# Patient Record
Sex: MALE | Race: WHITE | NOT HISPANIC OR LATINO | Employment: OTHER | URBAN - NONMETROPOLITAN AREA
[De-identification: names, ages, dates, MRNs, and addresses within clinical notes are randomized per-mention and may not be internally consistent; named-entity substitution may affect disease eponyms.]

---

## 2024-01-12 LAB — HBA1C MFR BLD HPLC: 5.7 %

## 2024-06-28 NOTE — PROGRESS NOTES
PT Evaluation     Today's date: 2024  Patient name: Bill Schultz  : 1955  MRN: [id number]  Referring provider: Jorge Dacosta MD  Dx:   Encounter Diagnosis     ICD-10-CM    1. Lumbar radiculopathy  M54.16                      Assessment  Impairments: abnormal or restricted ROM, activity intolerance, impaired physical strength, lacks appropriate home exercise program, pain with function and unable to perform ADL  Barriers to therapy: PT notes the patient with decrease ROM and strength t/o the lumbar spine as well as the bilateral hip and LE with trunk/core weakness leading to increase pain levels, LE radicular symptoms, and functional limitations with need for course of skilled therapy for 6 weeks with focus on lumbar stabilization with extension based POC, manual therapy, posture, analgesic modalities, and HEP.    Understanding of Dx/Px/POC: good     Prognosis: good    Goals  ST.  Initiate HEP  2.  Decrease symptoms by 25-50%  3.  Increase ROM by 25-50%   4.  Increase strength by 1-2 mm grades  LT.  Improve spinal stability with increase trunk/core strength   2.  No LE radicular symptoms  3.  Decrease limitations with standing, walking and stairs  4.  Decrease limitations with trunk movement, lifting, and carrying  5.  Decrease limitations with transfers and ADL  6.  DC with HEP      Plan  Patient would benefit from: skilled physical therapy and PT eval  Planned modality interventions: thermotherapy: hydrocollator packs    Planned therapy interventions: manual therapy, neuromuscular re-education, postural training, patient/caregiver education, self care, strengthening, stretching, balance, flexibility, gait training, graded exercise, home exercise program and therapeutic exercise    Frequency: 2x week  Duration in weeks: 6  Treatment plan discussed with: patient  Plan details: PT notes review of POC and findings with the patient who is in agreement with PT recommendations of course of skilled  therapy.          Subjective Evaluation    History of Present Illness  Mechanism of injury: Patient reports dealing with LB and right hip and symptoms for several years with a recent flare up of symptoms with symptoms traveling into the right foot and ankle.  Patient reports he was prescribed a course of prednisone for 6 days by PCP which the patient reports has greatly helped with symptoms but states is worried about return of symptoms when medication wears off.  Patient was also referred to OPPT to address lumbar radiculopathy.  Patient reports he is retired with significant PMH of right hand amputation.   Patient Goals  Patient goals for therapy: decreased pain, increased motion, increased strength, independence with ADLs/IADLs and return to sport/leisure activities    Pain  Current pain ratin  At best pain ratin  At worst pain rating: 10  Location: Right LE  Quality: radiating, tight, pulling, discomfort, dull ache and burning    Treatments  Current treatment: medication and physical therapy        Objective     Palpation   Left   Tenderness of the erector spinae and lumbar paraspinals.     Right   Tenderness of the erector spinae and lumbar paraspinals.     Tenderness     Left Hip   No tenderness in the PSIS, greater trochanter, iliac crest and sacroiliac joint.     Right Hip   Tenderness in the PSIS. No tenderness in the greater trochanter, iliac crest and sacroiliac joint.     Neurological Testing     Reflexes   Left   Patellar (L4): normal (2+)  Achilles (S1): normal (2+)    Right   Patellar (L4): normal (2+)  Achilles (S1): normal (2+)    Active Range of Motion     Lumbar   Flexion: 54 degrees   Extension: 11 degrees   Left lateral flexion: 13 degrees     Right lateral flexion: 12 degrees   Left rotation: 14 degrees   Right rotation: 13 degrees   Left Hip   Flexion: 52 degrees   Extension: 4 degrees   Abduction: 24 degrees   External rotation (90/90): 15 degrees   Internal rotation (90/90): 11  degrees     Right Hip   Flexion: 53 degrees   Extension: 5 degrees   Abduction: 21 degrees   External rotation (90/90): 16 degrees   Internal rotation (90/90): 10 degrees     Additional Active Range of Motion Details  Trunk/core strength with abdominals of 2+/5 and lumbar paraspinals of 3/5   PT notes decrease ROM and strength t/o the bilateral hip and LE     Passive Range of Motion   Left Hip   Flexion: 57 degrees   Extension: 5 degrees   Abduction: WFL  External rotation (90/90): 21 degrees   Internal rotation (90/90): 13 degrees     Right Hip   Flexion: 56 degrees   Extension: 5 degrees   Abduction: WFL  External rotation (90/90): 19 degrees   Internal rotation (90/90): 11 degrees     Joint Play     Hypomobile: L1, L2, L3, L4, L5 and S1     Pain: L4, L5 and S1     Strength/Myotome Testing     Left Hip   Planes of Motion   Flexion: 4+  Extension: 5  Abduction: 5  Adduction: 5  External rotation: 4+  Internal rotation: 4+    Right Hip   Planes of Motion   Flexion: 4+  Extension: 5  Abduction: 5  Adduction: 5  External rotation: 4    Tests     Lumbar     Left   Negative crossed SLR.     Right   Negative crossed SLR.     Left Hip   Negative HOUSTON, FADIR and long sit.   Zackary: Negative.   Modified Zackary: Negative.   90/90 SLR: Positive.   SLR: Positive.     Right Hip   Negative HOUSTON, FADIR and long sit.   Zackary: Negative.    Modified Zackary: Negative.   90/90 SLR: Positive.   SLR: Positive.              Precautions:  Right Hand Amputation   POC 8/1/24      Manuals 7/1       Lumbar PA mobs        Bilateral HS, hip ABD, piriformis, quad and gastroc with manual lumbar traction                         Neuro Re-Ed        Stand lumbar extension against wall         Stand OAL         TB Trunk rotation         Supine Tball ABD crunch         Seated Tball Trunk Rotation and PNF         Bridge with ABD         BOSU Bridge         Ther Ex        Treadmill for ROM and strength         Supine Tball LTR         Lunge walk          Side step and squat         Leg press DL and SL                         HEP update/review  15 min        Ther Activity                        Gait Training                        Modalities        MHP PRN

## 2024-07-01 ENCOUNTER — EVALUATION (OUTPATIENT)
Dept: PHYSICAL THERAPY | Facility: CLINIC | Age: 69
End: 2024-07-01
Payer: COMMERCIAL

## 2024-07-01 ENCOUNTER — HOSPITAL ENCOUNTER (OUTPATIENT)
Dept: RADIOLOGY | Facility: HOSPITAL | Age: 69
Discharge: HOME | End: 2024-07-01
Payer: COMMERCIAL

## 2024-07-01 DIAGNOSIS — M54.30 SCIATICA, UNSPECIFIED LATERALITY: ICD-10-CM

## 2024-07-01 DIAGNOSIS — M54.16 LUMBAR RADICULOPATHY: Primary | ICD-10-CM

## 2024-07-01 PROCEDURE — 72110 X-RAY EXAM L-2 SPINE 4/>VWS: CPT

## 2024-07-01 PROCEDURE — 97535 SELF CARE MNGMENT TRAINING: CPT | Performed by: PHYSICAL THERAPIST

## 2024-07-01 PROCEDURE — 97162 PT EVAL MOD COMPLEX 30 MIN: CPT | Performed by: PHYSICAL THERAPIST

## 2024-07-01 NOTE — LETTER
2024    Jorge Dacosta MD  [address]  [address]  [facility] [address]    Patient: Bill Schultz   YOB: 1955   Date of Visit: 2024     Encounter Diagnosis     ICD-10-CM    1. Lumbar radiculopathy  M54.16           Dear Dr. Dacosta:    Thank you for your recent referral of Bill Schultz. Please review the attached evaluation summary from Bill's recent visit.     Please verify that you agree with the plan of care by signing the attached order.     If you have any questions or concerns, please do not hesitate to call.     I sincerely appreciate the opportunity to share in the care of one of your patients and hope to have another opportunity to work with you in the near future.       Sincerely,    Vahe Ballesteros, PT      Referring Provider:      I certify that I have read the below Plan of Care and certify the need for these services furnished under this plan of treatment while under my care.                    Jorge Dacosta MD  [address]  [address]  [facility] [address]  Via Fax: [phone]          PT Evaluation     Today's date: 2024  Patient name: Bill Schultz  : 1955  MRN: [id number]  Referring provider: Jorge Dacosta MD  Dx:   Encounter Diagnosis     ICD-10-CM    1. Lumbar radiculopathy  M54.16                      Assessment  Impairments: abnormal or restricted ROM, activity intolerance, impaired physical strength, lacks appropriate home exercise program, pain with function and unable to perform ADL  Barriers to therapy: PT notes the patient with decrease ROM and strength t/o the lumbar spine as well as the bilateral hip and LE with trunk/core weakness leading to increase pain levels, LE radicular symptoms, and functional limitations with need for course of skilled therapy for 6 weeks with focus on lumbar stabilization with extension based POC, manual therapy, posture, analgesic modalities, and HEP.    Understanding of Dx/Px/POC: good      Prognosis: good    Goals  ST.  Initiate HEP  2.  Decrease symptoms by 25-50%  3.  Increase ROM by 25-50%   4.  Increase strength by 1-2 mm grades  LT.  Improve spinal stability with increase trunk/core strength   2.  No LE radicular symptoms  3.  Decrease limitations with standing, walking and stairs  4.  Decrease limitations with trunk movement, lifting, and carrying  5.  Decrease limitations with transfers and ADL  6.  DC with HEP      Plan  Patient would benefit from: skilled physical therapy and PT eval  Planned modality interventions: thermotherapy: hydrocollator packs    Planned therapy interventions: manual therapy, neuromuscular re-education, postural training, patient/caregiver education, self care, strengthening, stretching, balance, flexibility, gait training, graded exercise, home exercise program and therapeutic exercise    Frequency: 2x week  Duration in weeks: 6  Treatment plan discussed with: patient  Plan details: PT notes review of POC and findings with the patient who is in agreement with PT recommendations of course of skilled therapy.          Subjective Evaluation    History of Present Illness  Mechanism of injury: Patient reports dealing with LB and right hip and symptoms for several years with a recent flare up of symptoms with symptoms traveling into the right foot and ankle.  Patient reports he was prescribed a course of prednisone for 6 days by PCP which the patient reports has greatly helped with symptoms but states is worried about return of symptoms when medication wears off.  Patient was also referred to OPPT to address lumbar radiculopathy.  Patient reports he is retired with significant PMH of right hand amputation.   Patient Goals  Patient goals for therapy: decreased pain, increased motion, increased strength, independence with ADLs/IADLs and return to sport/leisure activities    Pain  Current pain ratin  At best pain ratin  At worst pain rating: 10  Location:  Right LE  Quality: radiating, tight, pulling, discomfort, dull ache and burning    Treatments  Current treatment: medication and physical therapy        Objective     Palpation   Left   Tenderness of the erector spinae and lumbar paraspinals.     Right   Tenderness of the erector spinae and lumbar paraspinals.     Tenderness     Left Hip   No tenderness in the PSIS, greater trochanter, iliac crest and sacroiliac joint.     Right Hip   Tenderness in the PSIS. No tenderness in the greater trochanter, iliac crest and sacroiliac joint.     Neurological Testing     Reflexes   Left   Patellar (L4): normal (2+)  Achilles (S1): normal (2+)    Right   Patellar (L4): normal (2+)  Achilles (S1): normal (2+)    Active Range of Motion     Lumbar   Flexion: 54 degrees   Extension: 11 degrees   Left lateral flexion: 13 degrees     Right lateral flexion: 12 degrees   Left rotation: 14 degrees   Right rotation: 13 degrees   Left Hip   Flexion: 52 degrees   Extension: 4 degrees   Abduction: 24 degrees   External rotation (90/90): 15 degrees   Internal rotation (90/90): 11 degrees     Right Hip   Flexion: 53 degrees   Extension: 5 degrees   Abduction: 21 degrees   External rotation (90/90): 16 degrees   Internal rotation (90/90): 10 degrees     Additional Active Range of Motion Details  Trunk/core strength with abdominals of 2+/5 and lumbar paraspinals of 3/5   PT notes decrease ROM and strength t/o the bilateral hip and LE     Passive Range of Motion   Left Hip   Flexion: 57 degrees   Extension: 5 degrees   Abduction: WFL  External rotation (90/90): 21 degrees   Internal rotation (90/90): 13 degrees     Right Hip   Flexion: 56 degrees   Extension: 5 degrees   Abduction: WFL  External rotation (90/90): 19 degrees   Internal rotation (90/90): 11 degrees     Joint Play     Hypomobile: L1, L2, L3, L4, L5 and S1     Pain: L4, L5 and S1     Strength/Myotome Testing     Left Hip   Planes of Motion   Flexion: 4+  Extension: 5  Abduction:  5  Adduction: 5  External rotation: 4+  Internal rotation: 4+    Right Hip   Planes of Motion   Flexion: 4+  Extension: 5  Abduction: 5  Adduction: 5  External rotation: 4    Tests     Lumbar     Left   Negative crossed SLR.     Right   Negative crossed SLR.     Left Hip   Negative HOUSTON, FADIR and long sit.   Zackary: Negative.   Modified Zackary: Negative.   90/90 SLR: Positive.   SLR: Positive.     Right Hip   Negative [place]STON, FADIR and long sit.   Zackary: Negative.    Modified Zackary: Negative.   90/90 SLR: Positive.   SLR: Positive.              Precautions:  Right Hand Amputation   POC 8/1/24      Manuals 7/1       Lumbar PA mobs        Bilateral HS, hip ABD, piriformis, quad and gastroc with manual lumbar traction                         Neuro Re-Ed        Stand lumbar extension against wall         Stand OAL         TB Trunk rotation         Supine Tball ABD crunch         Seated Tball Trunk Rotation and PNF         Bridge with ABD         BOSU Bridge         Ther Ex        Treadmill for ROM and strength         Supine Tball LTR         Lunge walk         Side step and squat         Leg press DL and SL                         HEP update/review  15 min        Ther Activity                        Gait Training                        Modalities        MHP PRN

## 2024-07-02 NOTE — PROGRESS NOTES
"Daily Note     Today's date: 7/3/2024  Patient name: Bill Schultz  : 1955  MRN: [id number]  Referring provider: Jorge Dacosta MD  Dx:   Encounter Diagnosis     ICD-10-CM    1. Lumbar radiculopathy  M54.16                      Subjective:  Patient reports he is compliant with HEP and feels no LE radicular symptoms with performance.  Patient reports he received x-ray results with normal results and no significant findings but he is continuing with MRI for further evaluation of the lumbar spine.       Objective: See treatment diary below      Assessment: Tolerated treatment well.  PT notes start of POC with focus on lumbar stabilization and manual therapy to decrease symptoms and improve functional limitations to meet therapy goals.  PT notes continuation of decrease ROM and strength t/o the lumbar spine as well as the bilateral hip and LE with trunk/core weakness and LE radicular symptoms with need for continuation of skilled therapy.       Plan: Continue per plan of care.      Precautions:  Right Hand Amputation   POC 24      Manuals 7/1 7/3      Lumbar PA mobs  5 min       Bilateral HS, hip ABD, piriformis, quad and gastroc with manual lumbar traction   10 min                       Neuro Re-Ed        Stand lumbar extension against wall   10x5\" Hold       Stand OAL   10x Bilat       TB Trunk rotation   2x10 Bilat Single Black       Supine Tball ABD crunch         Seated Tball Trunk Rotation and PNF         Bridge with ABD   2x10 Blue       BOSU Bridge         Ther Ex        Treadmill for ROM and strength   10 min 1.5-3.0 MPH       Supine Tball LTR         Lunge walk         Side step and squat         Leg press DL and SL   2x10 Each   165# DL  85# SL                       HEP update/review  15 min        Ther Activity                        Gait Training                        Modalities        MHP PRN                     "

## 2024-07-03 ENCOUNTER — OFFICE VISIT (OUTPATIENT)
Dept: PHYSICAL THERAPY | Facility: CLINIC | Age: 69
End: 2024-07-03
Payer: COMMERCIAL

## 2024-07-03 DIAGNOSIS — M54.16 LUMBAR RADICULOPATHY: Primary | ICD-10-CM

## 2024-07-03 PROCEDURE — 97112 NEUROMUSCULAR REEDUCATION: CPT | Performed by: PHYSICAL THERAPIST

## 2024-07-03 PROCEDURE — 97140 MANUAL THERAPY 1/> REGIONS: CPT | Performed by: PHYSICAL THERAPIST

## 2024-07-03 PROCEDURE — 97110 THERAPEUTIC EXERCISES: CPT | Performed by: PHYSICAL THERAPIST

## 2024-07-05 NOTE — PROGRESS NOTES
"Daily Note     Today's date: 2024  Patient name: Bill Schultz  : 1955  MRN: [id number]  Referring provider: Jorge Dacosta MD  Dx:   Encounter Diagnosis     ICD-10-CM    1. Lumbar radiculopathy  M54.16                      Subjective:  Patient reports the symptoms in the right LE have started to return with the prednisone wearing off.  Patient feels the HEP have been assisting with breaking up the symptoms.  Patient reports initially the symptoms were constant and chronic but states the symptoms are now more intermittent in nature which the patient feels his progress.        Objective: See treatment diary below      Assessment: Tolerated treatment well.  PT notes continuation of decrease ROM and strength t/o the lumbar spine as well as the bilateral hip and LE with trunk/core weakness and LE radicular symptoms with need for continuation of skilled therapy.       Plan: Continue per plan of care.      Precautions:  Right Hand Amputation   POC 24      Manuals 7/1 7/3 7/8     Lumbar PA mobs  5 min  5 min      Bilateral HS, hip ABD, piriformis, quad and gastroc with manual lumbar traction   10 min  10 min                      Neuro Re-Ed        Stand lumbar extension against wall   10x5\" Hold  10x5\" Hold      Stand OAL   10x Bilat  Prone   10x Bilat      TB Trunk rotation   2x10 Bilat Single Black  NT      Supine Tball ABD crunch    10x3\" Hold      Seated Tball Trunk Rotation and PNF         Bridge with ABD   2x10 Blue  2x10 Blue      BOSU Bridge         Ther Ex        Treadmill for ROM and strength   10 min 1.5-3.0 MPH  10 min   1.5-3.0 MPH      Supine Tball LTR    10x5\" Hold Bilat      Lunge walk         Side step and squat         Leg press DL and SL   2x10 Each   165# DL  85# SL  2x10   Each   165# DL  85# SL                      HEP update/review  15 min        Ther Activity                        Gait Training                        Modalities        MHP PRN                       "

## 2024-07-08 ENCOUNTER — OFFICE VISIT (OUTPATIENT)
Dept: PHYSICAL THERAPY | Facility: CLINIC | Age: 69
End: 2024-07-08
Payer: COMMERCIAL

## 2024-07-08 DIAGNOSIS — M54.16 LUMBAR RADICULOPATHY: Primary | ICD-10-CM

## 2024-07-08 PROCEDURE — 97140 MANUAL THERAPY 1/> REGIONS: CPT | Performed by: PHYSICAL THERAPIST

## 2024-07-08 PROCEDURE — 97110 THERAPEUTIC EXERCISES: CPT | Performed by: PHYSICAL THERAPIST

## 2024-07-08 PROCEDURE — 97112 NEUROMUSCULAR REEDUCATION: CPT | Performed by: PHYSICAL THERAPIST

## 2024-07-09 ENCOUNTER — HOSPITAL ENCOUNTER (OUTPATIENT)
Dept: MRI IMAGING | Facility: HOSPITAL | Age: 69
Discharge: HOME | End: 2024-07-09
Payer: COMMERCIAL

## 2024-07-09 DIAGNOSIS — M54.30 SCIATICA: ICD-10-CM

## 2024-07-09 PROCEDURE — 72148 MRI LUMBAR SPINE W/O DYE: CPT

## 2024-07-12 ENCOUNTER — OFFICE VISIT (OUTPATIENT)
Dept: PHYSICAL THERAPY | Facility: CLINIC | Age: 69
End: 2024-07-12
Payer: COMMERCIAL

## 2024-07-12 DIAGNOSIS — M54.16 LUMBAR RADICULOPATHY: Primary | ICD-10-CM

## 2024-07-12 PROCEDURE — 97112 NEUROMUSCULAR REEDUCATION: CPT

## 2024-07-12 PROCEDURE — 97140 MANUAL THERAPY 1/> REGIONS: CPT

## 2024-07-12 PROCEDURE — 97110 THERAPEUTIC EXERCISES: CPT

## 2024-07-12 NOTE — PROGRESS NOTES
"Daily Note     Today's date: 2024  Patient name: Bill Schultz  : 1955  MRN: [id number]  Referring provider: Jorge Dacosta MD  Dx:   Encounter Diagnosis     ICD-10-CM    1. Lumbar radiculopathy  M54.16                      Subjective: Patient reports that he is doing okay today.  \"My leg was screaming after the treadmill today.\"      Objective: See treatment diary below      Assessment: Tolerated treatment well. Patient exhibited good technique with therapeutic exercises and would benefit from continued PT to increase ROM/strength and endurance to improve his functional mobility.  Patient reports that he was feeling better than when he first came into PT today.        Plan: Continue per plan of care.      Precautions:  Right Hand Amputation   POC 24      Manuals 7/1 7/3 7/8 7/12    Lumbar PA mobs  5 min  5 min  5'    Bilateral HS, hip ABD, piriformis, quad and gastroc with manual lumbar traction   10 min  10 min  15'                    Neuro Re-Ed        Stand lumbar extension against wall   10x5\" Hold  10x5\" Hold  10x5\"    Stand OAL   10x Bilat  Prone   10x Bilat  2x10  Bilat stand-  ing    TB Trunk rotation   2x10 Bilat Single Black  NT      Supine Tball ABD crunch    10x3\" Hold  10x3\"    Seated Tball Trunk Rotation and PNF         Bridge with ABD   2x10 Blue  2x10 Blue  2x10 Blue    BOSU Bridge         Ther Ex        Treadmill for ROM and strength   10 min 1.5-3.0 MPH  10 min   1.5-3.0 MPH  10' 3.5MPH    Supine Tball LTR    10x5\" Hold Bilat  10x5\" bilat    Lunge walk         Side step and squat         Leg press DL and SL   2x10 Each   165# DL  85# SL  2x10   Each   165# DL  85# SL  165#DL  85#SL  2x10ea                    HEP update/review  15 min        Ther Activity                        Gait Training                        Modalities        MHP PRN                         "

## 2024-07-12 NOTE — PROGRESS NOTES
"Daily Note     Today's date: 7/15/2024  Patient name: Bill Schultz  : 1955  MRN: [id number]  Referring provider: Jorge Dacosta MD  Dx:   Encounter Diagnosis     ICD-10-CM    1. Lumbar radiculopathy  M54.16                      Subjective:  Patient reports since he stopped the steroids the LB symptoms have been increasing with right LE radicular symptoms.  Patient reports slight relief of symptoms post session but continuation of right LE radicular symptoms.       Objective: See treatment diary below      Assessment: Tolerated treatment well.  PT notes continuation of decrease ROM and strength t/o the lumbar spine as well as the bilateral hip and LE with trunk/core weakness and LE radicular symptoms with need for continuation of skilled therapy.       Plan: Continue per plan of care.      Precautions:  Right Hand Amputation   POC 24      Manuals 7/1 7/3 7/8 7/12 7/15   Lumbar PA mobs  5 min  5 min  5' 5 min    Bilateral HS, hip ABD, piriformis, quad and gastroc with manual lumbar traction   10 min  10 min  15' 15 min                    Neuro Re-Ed        Stand lumbar extension against wall   10x5\" Hold  10x5\" Hold  10x5\" 2x10   5\" Hold    Stand OAL   10x Bilat  Prone   10x Bilat  2x10  Bilat stand-  ing Prone   10x Bilat    TB Trunk rotation   2x10 Bilat Single Black  NT      Supine Tball ABD crunch    10x3\" Hold  10x3\" 2x10   3\" Hold    Seated Tball Trunk Rotation and PNF         Bridge with ABD   2x10 Blue  2x10 Blue  2x10 Blue 2x10 Blue    BOSU Bridge         Ther Ex        Treadmill for ROM and strength   10 min 1.5-3.0 MPH  10 min   1.5-3.0 MPH  10' 3.5MPH 10 min   1.5-2.5 MPH    Supine Tball LTR    10x5\" Hold Bilat  10x5\" bilat 10x5\" Hold Bilat    Lunge walk         Side step and squat         Leg press DL and SL   2x10 Each   165# DL  85# SL  2x10   Each   165# DL  85# SL  165#DL  85#SL  2x10ea NT                    HEP update/review  15 min        Ther Activity                      "   Gait Training    7/12                    Modalities    7/12    MHP PRN     CP 15 min LB in seated

## 2024-07-15 ENCOUNTER — OFFICE VISIT (OUTPATIENT)
Dept: PHYSICAL THERAPY | Facility: CLINIC | Age: 69
End: 2024-07-15
Payer: COMMERCIAL

## 2024-07-15 DIAGNOSIS — M54.16 LUMBAR RADICULOPATHY: Primary | ICD-10-CM

## 2024-07-15 PROCEDURE — 97110 THERAPEUTIC EXERCISES: CPT | Performed by: PHYSICAL THERAPIST

## 2024-07-15 PROCEDURE — 97112 NEUROMUSCULAR REEDUCATION: CPT | Performed by: PHYSICAL THERAPIST

## 2024-07-15 PROCEDURE — 97140 MANUAL THERAPY 1/> REGIONS: CPT | Performed by: PHYSICAL THERAPIST

## 2024-07-18 ENCOUNTER — OFFICE VISIT (OUTPATIENT)
Dept: PHYSICAL THERAPY | Facility: CLINIC | Age: 69
End: 2024-07-18
Payer: COMMERCIAL

## 2024-07-18 DIAGNOSIS — M54.16 LUMBAR RADICULOPATHY: Primary | ICD-10-CM

## 2024-07-18 PROCEDURE — 97110 THERAPEUTIC EXERCISES: CPT

## 2024-07-18 PROCEDURE — 97140 MANUAL THERAPY 1/> REGIONS: CPT

## 2024-07-18 PROCEDURE — 97112 NEUROMUSCULAR REEDUCATION: CPT

## 2024-07-18 NOTE — PROGRESS NOTES
"Daily Note     Today's date: 2024  Patient name: Bill Schultz  : 1955  MRN: [id number]  Referring provider: Jorge Dacosta MD  Dx:   Encounter Diagnosis     ICD-10-CM    1. Lumbar radiculopathy  M54.16                      Subjective:  Patient reports that he was taking prednisone earlier this week which helped with his pain. Notes that he has not taken it for the last 2 days and feels as if the pain in his back and in his R knee is starting to come back. Denies any pain into his legs.       Objective: See treatment diary below      Assessment: Tolerated treatment well.  PT notes continuation of decrease ROM and strength t/o the lumbar spine as well as the bilateral hip and LE with trunk/core weakness and LE radicular symptoms with need for continuation of skilled therapy.       Plan: Continue per plan of care.      Precautions:  Right Hand Amputation   POC 24      Manuals 7/1 7/3 7/8 7/12 7/15 7/18   Lumbar PA mobs  5 min  5 min  5' 5 min  5'   Bilateral HS, hip ABD, piriformis, quad and gastroc with manual lumbar traction   10 min  10 min  15' 15 min  10'                     Neuro Re-Ed       Stand lumbar extension against wall   10x5\" Hold  10x5\" Hold  10x5\" 2x10   5\" Hold  2x10 5\"hold   Stand OAL   10x Bilat  Prone   10x Bilat  2x10  Bilat stand-  ing Prone   10x Bilat  Prone 2x10 bilat   TB Trunk rotation   2x10 Bilat Single Black  NT       Supine Tball ABD crunch    10x3\" Hold  10x3\" 2x10   3\" Hold  2x10 3\"hold   Seated Tball Trunk Rotation and PNF          Bridge with ABD   2x10 Blue  2x10 Blue  2x10 Blue 2x10 Blue  2x10 Blue   BOSU Bridge          Ther Ex       Treadmill for ROM and strength   10 min 1.5-3.0 MPH  10 min   1.5-3.0 MPH  10' 3.5MPH 10 min   1.5-2.5 MPH  10' 3.0MPH   Supine Tball LTR    10x5\" Hold Bilat  10x5\" bilat 10x5\" Hold Bilat  10x5\" bilat   Lunge walk          Side step and squat          Leg press DL and SL   2x10 Each   165# DL  85# SL  2x10   Each "   165# DL  85# SL  165#DL  85#SL  2x10ea NT  165#DL  85#SL  2x10ea                     HEP update/review  15 min         Ther Activity    7/12 7/18                     Gait Training    7/12 7/18                     Modalities    7/12 7/18   MHP PRN     CP 15 min LB in seated

## 2024-07-19 ENCOUNTER — APPOINTMENT (OUTPATIENT)
Dept: PHYSICAL THERAPY | Facility: CLINIC | Age: 69
End: 2024-07-19
Payer: COMMERCIAL

## 2024-07-22 ENCOUNTER — APPOINTMENT (OUTPATIENT)
Dept: PHYSICAL THERAPY | Facility: CLINIC | Age: 69
End: 2024-07-22
Payer: COMMERCIAL

## 2024-07-23 ENCOUNTER — OFFICE VISIT (OUTPATIENT)
Dept: PHYSICAL THERAPY | Facility: CLINIC | Age: 69
End: 2024-07-23
Payer: COMMERCIAL

## 2024-07-23 ENCOUNTER — APPOINTMENT (OUTPATIENT)
Dept: LAB | Facility: HOSPITAL | Age: 69
End: 2024-07-23
Payer: COMMERCIAL

## 2024-07-23 ENCOUNTER — OFFICE VISIT (OUTPATIENT)
Dept: FAMILY MEDICINE CLINIC | Facility: CLINIC | Age: 69
End: 2024-07-23
Payer: COMMERCIAL

## 2024-07-23 VITALS
DIASTOLIC BLOOD PRESSURE: 60 MMHG | HEART RATE: 78 BPM | SYSTOLIC BLOOD PRESSURE: 149 MMHG | BODY MASS INDEX: 35.84 KG/M2 | WEIGHT: 256 LBS | OXYGEN SATURATION: 95 % | HEIGHT: 71 IN

## 2024-07-23 DIAGNOSIS — K86.9 LESION OF PANCREAS: Primary | ICD-10-CM

## 2024-07-23 DIAGNOSIS — C61 PROSTATE CANCER (HCC): ICD-10-CM

## 2024-07-23 DIAGNOSIS — E78.49 OTHER HYPERLIPIDEMIA: ICD-10-CM

## 2024-07-23 DIAGNOSIS — E04.9 GOITER: ICD-10-CM

## 2024-07-23 DIAGNOSIS — M54.16 LUMBAR RADICULOPATHY: Primary | ICD-10-CM

## 2024-07-23 PROBLEM — R93.1 ELEVATED CORONARY ARTERY CALCIUM SCORE: Status: ACTIVE | Noted: 2024-07-23

## 2024-07-23 PROCEDURE — 97110 THERAPEUTIC EXERCISES: CPT

## 2024-07-23 PROCEDURE — 97112 NEUROMUSCULAR REEDUCATION: CPT

## 2024-07-23 PROCEDURE — 99204 OFFICE O/P NEW MOD 45 MIN: CPT | Performed by: INTERNAL MEDICINE

## 2024-07-23 PROCEDURE — 97140 MANUAL THERAPY 1/> REGIONS: CPT

## 2024-07-23 RX ORDER — MELOXICAM 15 MG/1
15 TABLET ORAL DAILY
COMMUNITY
Start: 2024-07-17 | End: 2024-07-23

## 2024-07-23 RX ORDER — ASPIRIN 81 MG/1
81 TABLET, CHEWABLE ORAL DAILY
COMMUNITY

## 2024-07-23 RX ORDER — METHYLPREDNISOLONE 4 MG/1
TABLET ORAL
COMMUNITY
Start: 2024-07-15 | End: 2024-07-23

## 2024-07-23 RX ORDER — ATORVASTATIN CALCIUM 10 MG/1
10 TABLET, FILM COATED ORAL DAILY
COMMUNITY
Start: 2024-01-10

## 2024-07-23 RX ORDER — CYCLOBENZAPRINE HCL 10 MG
10 TABLET ORAL EVERY 8 HOURS PRN
COMMUNITY
Start: 2024-06-24 | End: 2024-07-23

## 2024-07-23 NOTE — PROGRESS NOTES
"Daily Note     Today's date: 2024  Patient name: Bill Schultz  : 1955  MRN: [id number]  Referring provider: Jorge Dacosta MD  Dx:   Encounter Diagnosis     ICD-10-CM    1. Lumbar radiculopathy  M54.16                      Subjective: Patient reports that he is pleased with his progress.  \"I can sleep at night w/o an ice pack.  I think these exs are kicking in.\"      Objective: See treatment diary below      Assessment: Tolerated treatment well. Patient exhibited good technique with therapeutic exercises and would benefit from continued PT to increase ROM/strength and endurance to improve his functional mobility.      Plan: Continue per plan of care.      Precautions:  Right Hand Amputation   POC 24      Manuals 7/8 7/12 7/15 7/18 7/23   Lumbar PA mobs 5 min  5' 5 min  5' 5'   Bilateral HS, hip ABD, piriformis, quad and gastroc with manual lumbar traction  10 min  15' 15 min  10' 10'                   Neuro Re-Ed     Stand lumbar extension against wall  10x5\" Hold  10x5\" 2x10   5\" Hold  2x10 5\"hold 2x10 5\"hold   Stand OAL  Prone   10x Bilat  2x10  Bilat stand-  ing Prone   10x Bilat  Prone 2x10 bilat Prone 2x10 bilat   TB Trunk rotation  NT        Supine Tball ABD crunch  10x3\" Hold  10x3\" 2x10   3\" Hold  2x10 3\"hold 2x10 3\"hold   Seated Tball Trunk Rotation and PNF         Bridge with ABD  2x10 Blue  2x10 Blue 2x10 Blue  2x10 Blue 2x10 Blue   [facility] Bridge         Ther Ex     Treadmill for ROM and strength  10 min   1.5-3.0 MPH  10' 3.5MPH 10 min   1.5-2.5 MPH  10' 3.0MPH 10' 3.1MPH   Supine Tball LTR  10x5\" Hold Bilat  10x5\" bilat 10x5\" Hold Bilat  10x5\" bilat 15x5\" bilat   Lunge walk         Side step and squat         Leg press DL and SL  2x10   Each   165# DL  85# SL  165#DL  85#SL  2x10ea NT  165#DL  85#SL  2x10ea 185#DL  95#SL  2x10ea                   HEP update/review         Ther Activity                     Gait Training           "           Modalities  7/12 7/18 7/23   MHP   CP 15 min LB in seated

## 2024-07-23 NOTE — PROGRESS NOTES
Ambulatory Visit  Name: Bill Schultz      : 1955      MRN: [id number]  Encounter Provider: Dean Goins MD  Encounter Date: 2024   Encounter department: [facility] PRIMARY [facility]    Assessment & Plan  Lesion of pancreas  No history of pancreatic disease or family history of pancreatic cancer.  We are going to go ahead and set up a CT scan of the abdomen and pelvis.  Other hyperlipidemia  He is on Lipitor.  Will check a fasting lipid panel and liver function test.  Prostate cancer (HCC)  Status post robotic prostatectomy.  Will check PSA.  Goiter  I think he has a cystic nodule on his thyroid as well.  Will check an ultrasound and a TSH.          History of Present Illness     History of Present Illness  This is a 68-year-old male who is here today with his wife. He has been getting treatment for lumbar radiculopathy.  An MRI of the lumbar spine performed on  revealed a cystic lesion in the region of the pancreas.  He is here because he needs to have additional imaging ordered to further assess this lesion. He has a history of hyperlipidemia. He is accompanied by his wife. He doesn't have any history of pancreatitis or any problems with his pancreas. He used to drink alcohol heavily in the past but didn't have any bouts of several abdominal pain back then. He has no abdominal pain, vomiting, diarrhea, hematochezia or weight loss.     Past Medical History:   Diagnosis Date    Prostate cancer (HCC)     Robot Radical prostectomy     Past Surgical History:   Procedure Laterality Date    HAND AMPUTATION  AT WRIST      PROSTATE SURGERY       Family History   Problem Relation Age of Onset    Colon cancer Mother     No Known Problems Father     Uterine cancer Sister         kidney cyst    No Known Problems Sister      Social History     Tobacco Use    Smoking status: Former     Current packs/day: 0.00     Average packs/day: 1 pack/day for 24.0 years (24.0 ttl pk-yrs)     Types:  "Cigarettes     Start date: 1980     Quit date: 2004     Years since quittin.5     Passive exposure: Never    Smokeless tobacco: Never   Vaping Use    Vaping status: Never Used   Substance and Sexual Activity    Alcohol use: Not Currently     Alcohol/week: 4.0 standard drinks of alcohol     Types: 4 Cans of beer per week    Drug use: Never    Sexual activity: Yes     Partners: Female     Current Outpatient Medications on File Prior to Visit   Medication Sig    aspirin 81 mg chewable tablet Chew 81 mg daily    atorvastatin (LIPITOR) 10 mg tablet Take 10 mg by mouth daily    [DISCONTINUED] cyclobenzaprine (FLEXERIL) 10 mg tablet Take 10 mg by mouth every 8 (eight) hours as needed for muscle spasms (Patient not taking: Reported on 2024)    [DISCONTINUED] meloxicam (MOBIC) 15 mg tablet Take 15 mg by mouth daily (Patient not taking: Reported on 2024)    [DISCONTINUED] methylPREDNISolone 4 MG tablet therapy pack TAKE 6 TABLETS ON DAY 1 AS DIRECTED ON PACKAGE AND DECREASE BY 1 TAB EACH DAY FOR A TOTAL OF 6 DAYS (Patient not taking: Reported on 2024)     No Known Allergies    There is no immunization history on file for this patient.  Objective     /60 (BP Location: Right arm, Patient Position: Sitting, Cuff Size: Large)   Pulse 78   Ht 5' 11\" (1.803 m)   Wt 116 kg (256 lb)   SpO2 95%   BMI 35.70 kg/m²     Physical Exam    Physical Exam  Vitals reviewed.   Constitutional:       General: He is not in acute distress.     Appearance: Normal appearance. He is well-developed. He is not ill-appearing.   HENT:      Head: Normocephalic and atraumatic.      Right Ear: Tympanic membrane and external ear normal.      Left Ear: Tympanic membrane and external ear normal.      Nose: Nose normal.      Mouth/Throat:      Mouth: Mucous membranes are moist.      Pharynx: Oropharynx is clear.   Eyes:      General: No scleral icterus.  Neck:      Thyroid: No thyromegaly.      Vascular: No JVD.      " Comments: I believe there is a cystic nodule on the right lobe of the thyroid.  Cardiovascular:      Rate and Rhythm: Normal rate and regular rhythm.      Heart sounds: Normal heart sounds. No murmur heard.     No friction rub. No gallop.   Pulmonary:      Breath sounds: Normal breath sounds.   Abdominal:      General: Bowel sounds are normal. There is no distension.      Palpations: Abdomen is soft. There is no mass.   Musculoskeletal:         General: No swelling.      Comments: Right hand amputee with prosthesis present.   Neurological:      Mental Status: He is alert.      Comments: Cranial nerves II-XII intact, motor was 5/5 in all major groups with the exception of his hand and wrist muscles on the right which are absent.   Psychiatric:         Mood and Affect: Mood normal.

## 2024-07-23 NOTE — ASSESSMENT & PLAN NOTE
No history of pancreatic disease or family history of pancreatic cancer.  We are going to go ahead and set up a CT scan of the abdomen and pelvis.

## 2024-07-23 NOTE — ASSESSMENT & PLAN NOTE
Status post robotic prostatectomy.  Will check PSA.   Universal Safety Interventions Fall with Harm Risk

## 2024-07-24 ENCOUNTER — APPOINTMENT (OUTPATIENT)
Dept: LAB | Facility: HOSPITAL | Age: 69
End: 2024-07-24
Payer: COMMERCIAL

## 2024-07-24 LAB
ALBUMIN SERPL BCG-MCNC: 4.3 G/DL (ref 3.5–5)
ALP SERPL-CCNC: 69 U/L (ref 34–104)
ALT SERPL W P-5'-P-CCNC: 30 U/L (ref 7–52)
ANION GAP SERPL CALCULATED.3IONS-SCNC: 5 MMOL/L (ref 4–13)
AST SERPL W P-5'-P-CCNC: 21 U/L (ref 13–39)
BILIRUB DIRECT SERPL-MCNC: 0.09 MG/DL (ref 0–0.2)
BILIRUB SERPL-MCNC: 0.57 MG/DL (ref 0.2–1)
BUN SERPL-MCNC: 12 MG/DL (ref 5–25)
CALCIUM SERPL-MCNC: 9.7 MG/DL (ref 8.4–10.2)
CHLORIDE SERPL-SCNC: 105 MMOL/L (ref 96–108)
CHOLEST SERPL-MCNC: 130 MG/DL
CO2 SERPL-SCNC: 29 MMOL/L (ref 21–32)
CREAT SERPL-MCNC: 0.99 MG/DL (ref 0.6–1.3)
GFR SERPL CREATININE-BSD FRML MDRD: 78 ML/MIN/1.73SQ M
GLUCOSE P FAST SERPL-MCNC: 121 MG/DL (ref 65–99)
HDLC SERPL-MCNC: 41 MG/DL
LDLC SERPL CALC-MCNC: 63 MG/DL (ref 0–100)
NONHDLC SERPL-MCNC: 89 MG/DL
POTASSIUM SERPL-SCNC: 4.4 MMOL/L (ref 3.5–5.3)
PROT SERPL-MCNC: 7.1 G/DL (ref 6.4–8.4)
PSA SERPL-MCNC: <0.008 NG/ML (ref 0–4)
SODIUM SERPL-SCNC: 139 MMOL/L (ref 135–147)
TRIGL SERPL-MCNC: 130 MG/DL
TSH SERPL DL<=0.05 MIU/L-ACNC: 1.38 UIU/ML (ref 0.45–4.5)

## 2024-07-24 PROCEDURE — 80048 BASIC METABOLIC PNL TOTAL CA: CPT

## 2024-07-24 PROCEDURE — 84153 ASSAY OF PSA TOTAL: CPT

## 2024-07-24 PROCEDURE — 80076 HEPATIC FUNCTION PANEL: CPT

## 2024-07-24 PROCEDURE — 36415 COLL VENOUS BLD VENIPUNCTURE: CPT

## 2024-07-24 PROCEDURE — 84443 ASSAY THYROID STIM HORMONE: CPT

## 2024-07-24 PROCEDURE — 80061 LIPID PANEL: CPT

## 2024-07-26 ENCOUNTER — EVALUATION (OUTPATIENT)
Dept: PHYSICAL THERAPY | Facility: CLINIC | Age: 69
End: 2024-07-26
Payer: COMMERCIAL

## 2024-07-26 DIAGNOSIS — M54.16 LUMBAR RADICULOPATHY: Primary | ICD-10-CM

## 2024-07-26 PROCEDURE — 97140 MANUAL THERAPY 1/> REGIONS: CPT

## 2024-07-26 PROCEDURE — 97140 MANUAL THERAPY 1/> REGIONS: CPT | Performed by: PHYSICAL THERAPIST

## 2024-07-26 PROCEDURE — 97112 NEUROMUSCULAR REEDUCATION: CPT

## 2024-07-26 PROCEDURE — 97110 THERAPEUTIC EXERCISES: CPT

## 2024-07-26 NOTE — LETTER
2024    Jorge Dacosta MD  [address]  [address]  [facility] [address]    Patient: Bill Schultz   YOB: 1955   Date of Visit: 2024     Encounter Diagnosis     ICD-10-CM    1. Lumbar radiculopathy  M54.16           Dear Dr. Dacosta:    Thank you for your recent referral of Bill Schultz. Please review the attached re-evaluation summary from Bill's recent visit.     Please verify that you agree with the plan of care by signing the attached order.     If you have any questions or concerns, please do not hesitate to call.     I sincerely appreciate the opportunity to share in the care of one of your patients and hope to have another opportunity to work with you in the near future.       Sincerely,    Vahe Ballesteros, PT      Referring Provider:      I certify that I have read the below Plan of Care and certify the need for these services furnished under this plan of treatment while under my care.                    oJrge Dacosta MD  [address]  [address]  [facility] [address]  Via Fax: [phone]          PT Re-Evaluation     Today's date: 2024  Patient name: Bill Schultz  : 1955  MRN: [id number]  Referring provider: Jorge Dacosta MD  Dx:   Encounter Diagnosis     ICD-10-CM    1. Lumbar radiculopathy  M54.16                      Assessment  Impairments: abnormal or restricted ROM, activity intolerance, impaired physical strength, lacks appropriate home exercise program, pain with function and unable to perform ADL  Barriers to therapy: PT notes the patient with continuation of decrease ROM and strength t/o the lumbar spine as well as the bilateral hip and LE with trunk/core weakness leading to increase pain levels, LE radicular symptoms, and functional limitations with need for continuation of skilled therapy for 4 weeks with focus on lumbar stabilization with extension based POC, manual therapy, posture, analgesic modalities, and transition to HEP.     Understanding of Dx/Px/POC: good     Prognosis: good    Goals  ST.  Initiate HEP-MET  2.  Decrease symptoms by 25-50%-MET  3.  Increase ROM by 25-50%-MET   4.  Increase strength by 1-2 mm grades-MET  LT.  Improve spinal stability with increase trunk/core strength-Progressing    2.  No LE radicular symptoms-Progressing  3.  Decrease limitations with standing, walking and stairs-Progressing   4.  Decrease limitations with trunk movement, lifting, and carrying-Progressing   5.  Decrease limitations with transfers and ADL-MET  6.  DC with HEP-Progressing       Plan  Patient would benefit from: skilled physical therapy  Planned modality interventions: thermotherapy: hydrocollator packs    Planned therapy interventions: manual therapy, neuromuscular re-education, postural training, patient/caregiver education, self care, strengthening, stretching, balance, flexibility, gait training, graded exercise, home exercise program and therapeutic exercise    Frequency: 2x week  Duration in weeks: 4  Treatment plan discussed with: patient  Plan details: Transition to HEP.        Subjective Evaluation    History of Present Illness  Mechanism of injury: Presently the patient has attended 8 sessions of skilled therapy and feels 50% improvement since the start of therapy.  Patient reports the pain levels in the LB and right LE have decreased in frequency, intensity and duration since the start of therapy.  Patient reports the HEP have assisted with decreasing the symptoms.  Patient reports he would like to continue with skilled therapy at this time to continue to decrease pain levels in the LB and right LE.   Patient Goals  Patient goals for therapy: decreased pain, increased motion, increased strength, independence with ADLs/IADLs and return to sport/leisure activities    Pain  Current pain rating: 3  At best pain ratin  At worst pain ratin  Location: Right LE  Quality: radiating, tight, pulling, discomfort, dull  ache and burning  Progression: improved    Treatments  Current treatment: medication and physical therapy        Objective     Palpation   Left   Tenderness of the erector spinae and lumbar paraspinals.     Right   Tenderness of the erector spinae and lumbar paraspinals.     Tenderness     Left Hip   No tenderness in the PSIS, greater trochanter, iliac crest and sacroiliac joint.     Right Hip   Tenderness in the PSIS. No tenderness in the greater trochanter, iliac crest and sacroiliac joint.     Neurological Testing     Reflexes   Left   Patellar (L4): normal (2+)  Achilles (S1): normal (2+)    Right   Patellar (L4): normal (2+)  Achilles (S1): normal (2+)    Active Range of Motion     Lumbar   Flexion: 61 degrees   Extension: 16 degrees   Left lateral flexion:  WFL  Right lateral flexion:  WFL  Left rotation: 14 degrees   Right rotation: 13 degrees   Left Hip   Flexion: 57 degrees   Extension: 5 degrees   Abduction: WFL  External rotation (90/90): 15 degrees   Internal rotation (90/90): 11 degrees     Right Hip   Flexion: 58 degrees   Extension: 5 degrees   Abduction: WFL  External rotation (90/90): 16 degrees   Internal rotation (90/90): 10 degrees     Additional Active Range of Motion Details  Trunk/core strength with abdominals of 3+/5 and lumbar paraspinals of 3+/5   PT notes decrease ROM and strength t/o the bilateral hip and LE     Passive Range of Motion   Left Hip   Flexion: 59 degrees   Extension: 5 degrees   Abduction: WFL  External rotation (90/90): 21 degrees   Internal rotation (90/90): 13 degrees     Right Hip   Flexion: 61 degrees   Extension: 5 degrees   Abduction: WFL  External rotation (90/90): 19 degrees   Internal rotation (90/90): 11 degrees     Joint Play     Hypomobile: L1, L2, L3, L4, L5 and S1     Pain: L4, L5 and S1     Strength/Myotome Testing     Left Hip   Planes of Motion   Flexion: 4+  Extension: 5  Abduction: 5  Adduction: 5  External rotation: 4+  Internal rotation: 4+    Right Hip  "  Planes of Motion   Flexion: 4+  Extension: 5  Abduction: 5  Adduction: 5  External rotation: 4+  Internal rotation: 4+    Tests     Lumbar     Left   Negative crossed SLR.     Right   Negative crossed SLR.     Left Hip   Negative HOUSTON, FADIR and long sit.   Zackary: Negative.   Modified Zackary: Negative.   90/90 SLR: Positive.   SLR: Positive.     Right Hip   Negative HOUSTON, FADIR and long sit.   Zackary: Negative.    Modified Zackary: Negative.   90/90 SLR: Positive.   SLR: Positive.              Precautions:  Right Hand Amputation   POC 24          Daily Note     Today's date: 2024  Patient name: Bill Schultz  : 1955  MRN: [id number]  Referring provider: Jorge Dacosta MD  Dx:   Encounter Diagnosis     ICD-10-CM    1. Lumbar radiculopathy  M54.16                      Subjective: Patient reports that the tingling down his leg isn't as bad as it used to be.  \"Mostly its not there anymore, but I do notice it after I got off of the treadmill today.\"      Objective: See treatment diary below      Assessment: Tolerated treatment well. Patient exhibited good technique with therapeutic exercises and would benefit from continued PT to increase ROM/strength and endurance to improve his overall mobility.      Plan: Continue per plan of care.      Precautions:  Right Hand Amputation   POC 24      Manuals 7/12 7/15 7/18 7/23 7/26   Lumbar PA mobs 5' 5 min  5' 5' 5'   Bilateral HS, hip ABD, piriformis, quad and gastroc with manual lumbar traction  15' 15 min  10' 10' 10'                   Neuro Re-Ed    Stand lumbar extension against wall  10x5\" 2x10   5\" Hold  2x10 5\"hold 2x10 5\"hold 2x10 5\"hold   Stand OAL  2x10  Bilat stand-  ing Prone   10x Bilat  Prone 2x10 bilat Prone 2x10 bilat Prone 2x10 bilat   TB Trunk rotation         Supine Tball ABD crunch  10x3\" 2x10   3\" Hold  2x10 3\"hold 2x10 3\"hold 2x10 3\"hold   Seated Tball Trunk Rotation and PNF         Bridge with ABD  2x10 Blue " "2x10 Blue  2x10 Blue 2x10 Blue 2x10 Blue   [facility] Bridge         Ther Ex 7/12 7/18 7/23 7/26   Treadmill for ROM and strength  10' 3.5MPH 10 min   1.5-2.5 MPH  10' 3.0MPH 10' 3.1MPH 10' 3.2MPH   Supine Tball LTR  10x5\" bilat 10x5\" Hold Bilat  10x5\" bilat 15x5\" bilat 15x5\" bilat   Lunge walk         Side step and squat         Leg press DL and SL  165#DL  85#SL  2x10ea NT  165#DL  85#SL  2x10ea 185#DL  95#SL  2x10ea 185#DL  95#SL  2x10ea                   HEP update/review         Ther Activity 7/12 7/18 7/23 7/26                   Gait Training 7/12 7/18 7/23 7/26                   Modalities 7/12 7/18 7/23 7/26   MHP  CP 15 min LB in seated                                              "

## 2024-07-26 NOTE — PROGRESS NOTES
PT Re-Evaluation     Today's date: 2024  Patient name: Bill Schultz  : 1955  MRN: [id number]  Referring provider: Jorge Dacosta MD  Dx:   Encounter Diagnosis     ICD-10-CM    1. Lumbar radiculopathy  M54.16                      Assessment  Impairments: abnormal or restricted ROM, activity intolerance, impaired physical strength, lacks appropriate home exercise program, pain with function and unable to perform ADL  Barriers to therapy: PT notes the patient with continuation of decrease ROM and strength t/o the lumbar spine as well as the bilateral hip and LE with trunk/core weakness leading to increase pain levels, LE radicular symptoms, and functional limitations with need for continuation of skilled therapy for 4 weeks with focus on lumbar stabilization with extension based POC, manual therapy, posture, analgesic modalities, and transition to HEP.    Understanding of Dx/Px/POC: good     Prognosis: good    Goals  ST.  Initiate HEP-MET  2.  Decrease symptoms by 25-50%-MET  3.  Increase ROM by 25-50%-MET   4.  Increase strength by 1-2 mm grades-MET  LT.  Improve spinal stability with increase trunk/core strength-Progressing    2.  No LE radicular symptoms-Progressing  3.  Decrease limitations with standing, walking and stairs-Progressing   4.  Decrease limitations with trunk movement, lifting, and carrying-Progressing   5.  Decrease limitations with transfers and ADL-MET  6.  DC with HEP-Progressing       Plan  Patient would benefit from: skilled physical therapy  Planned modality interventions: thermotherapy: hydrocollator packs    Planned therapy interventions: manual therapy, neuromuscular re-education, postural training, patient/caregiver education, self care, strengthening, stretching, balance, flexibility, gait training, graded exercise, home exercise program and therapeutic exercise    Frequency: 2x week  Duration in weeks: 4  Treatment plan discussed with: patient  Plan details:  Transition to HEP.        Subjective Evaluation    History of Present Illness  Mechanism of injury: Presently the patient has attended 8 sessions of skilled therapy and feels 50% improvement since the start of therapy.  Patient reports the pain levels in the LB and right LE have decreased in frequency, intensity and duration since the start of therapy.  Patient reports the HEP have assisted with decreasing the symptoms.  Patient reports he would like to continue with skilled therapy at this time to continue to decrease pain levels in the LB and right LE.   Patient Goals  Patient goals for therapy: decreased pain, increased motion, increased strength, independence with ADLs/IADLs and return to sport/leisure activities    Pain  Current pain rating: 3  At best pain ratin  At worst pain ratin  Location: Right LE  Quality: radiating, tight, pulling, discomfort, dull ache and burning  Progression: improved    Treatments  Current treatment: medication and physical therapy        Objective     Palpation   Left   Tenderness of the erector spinae and lumbar paraspinals.     Right   Tenderness of the erector spinae and lumbar paraspinals.     Tenderness     Left Hip   No tenderness in the PSIS, greater trochanter, iliac crest and sacroiliac joint.     Right Hip   Tenderness in the PSIS. No tenderness in the greater trochanter, iliac crest and sacroiliac joint.     Neurological Testing     Reflexes   Left   Patellar (L4): normal (2+)  Achilles (S1): normal (2+)    Right   Patellar (L4): normal (2+)  Achilles (S1): normal (2+)    Active Range of Motion     Lumbar   Flexion: 61 degrees   Extension: 16 degrees   Left lateral flexion:  WFL  Right lateral flexion:  WFL  Left rotation: 14 degrees   Right rotation: 13 degrees   Left Hip   Flexion: 57 degrees   Extension: 5 degrees   Abduction: WFL  External rotation (90/90): 15 degrees   Internal rotation (90/90): 11 degrees     Right Hip   Flexion: 58 degrees   Extension: 5  degrees   Abduction: WFL  External rotation (90/90): 16 degrees   Internal rotation (90/90): 10 degrees     Additional Active Range of Motion Details  Trunk/core strength with abdominals of 3+/5 and lumbar paraspinals of 3+/5   PT notes decrease ROM and strength t/o the bilateral hip and LE     Passive Range of Motion   Left Hip   Flexion: 59 degrees   Extension: 5 degrees   Abduction: WFL  External rotation (90/90): 21 degrees   Internal rotation (90/90): 13 degrees     Right Hip   Flexion: 61 degrees   Extension: 5 degrees   Abduction: WFL  External rotation (90/90): 19 degrees   Internal rotation (90/90): 11 degrees     Joint Play     Hypomobile: L1, L2, L3, L4, L5 and S1     Pain: L4, L5 and S1     Strength/Myotome Testing     Left Hip   Planes of Motion   Flexion: 4+  Extension: 5  Abduction: 5  Adduction: 5  External rotation: 4+  Internal rotation: 4+    Right Hip   Planes of Motion   Flexion: 4+  Extension: 5  Abduction: 5  Adduction: 5  External rotation: 4+  Internal rotation: 4+    Tests     Lumbar     Left   Negative crossed SLR.     Right   Negative crossed SLR.     Left Hip   Negative HOUSTON, FADIR and long sit.   Zackary: Negative.   Modified Zackary: Negative.   90/90 SLR: Positive.   SLR: Positive.     Right Hip   Negative [place]STON, FADIR and long sit.   Zackary: Negative.    Modified Zackary: Negative.   90/90 SLR: Positive.   SLR: Positive.              Precautions:  Right Hand Amputation   POC 8/26/24

## 2024-07-26 NOTE — PROGRESS NOTES
"Daily Note     Today's date: 2024  Patient name: Bill Schultz  : 1955  MRN: [id number]  Referring provider: Jorge Dacosta MD  Dx:   Encounter Diagnosis     ICD-10-CM    1. Lumbar radiculopathy  M54.16                      Subjective: Patient reports that the tingling down his leg isn't as bad as it used to be.  \"Mostly its not there anymore, but I do notice it after I got off of the treadmill today.\"      Objective: See treatment diary below      Assessment: Tolerated treatment well. Patient exhibited good technique with therapeutic exercises and would benefit from continued PT to increase ROM/strength and endurance to improve his overall mobility.      Plan: Continue per plan of care.      Precautions:  Right Hand Amputation   POC 24      Manuals 7/12 7/15 7/18 7/23 7/26   Lumbar PA mobs 5' 5 min  5' 5' 5'   Bilateral HS, hip ABD, piriformis, quad and gastroc with manual lumbar traction  15' 15 min  10' 10' 10'                   Neuro Re-Ed    Stand lumbar extension against wall  10x5\" 2x10   5\" Hold  2x10 5\"hold 2x10 5\"hold 2x10 5\"hold   Stand OAL  2x10  Bilat stand-  ing Prone   10x Bilat  Prone 2x10 bilat Prone 2x10 bilat Prone 2x10 bilat   TB Trunk rotation         Supine Tball ABD crunch  10x3\" 2x10   3\" Hold  2x10 3\"hold 2x10 3\"hold 2x10 3\"hold   Seated Tball Trunk Rotation and PNF         Bridge with ABD  2x10 Blue 2x10 Blue  2x10 Blue 2x10 Blue 2x10 Blue   BOS[facility] Bridge         Ther Ex    Treadmill for ROM and strength  10' 3.5MPH 10 min   1.5-2.5 MPH  10' 3.0MPH 10' 3.1MPH 10' 3.2MPH   Supine Tball LTR  10x5\" bilat 10x5\" Hold Bilat  10x5\" bilat 15x5\" bilat 15x5\" bilat   Lunge walk         Side step and squat         Leg press DL and SL  165#DL  85#SL  2x10ea NT  165#DL  85#SL  2x10ea 185#DL  95#SL  2x10ea 185#DL  95#SL  2x10ea                   HEP update/review         Ther Activity                    Gait Training  " 7/23 7/26                   Modalities 7/12 7/18 7/23 7/26   MHP  CP 15 min LB in seated

## 2024-07-29 ENCOUNTER — OFFICE VISIT (OUTPATIENT)
Dept: PHYSICAL THERAPY | Facility: CLINIC | Age: 69
End: 2024-07-29
Payer: COMMERCIAL

## 2024-07-29 DIAGNOSIS — M54.16 LUMBAR RADICULOPATHY: Primary | ICD-10-CM

## 2024-07-29 PROCEDURE — 97110 THERAPEUTIC EXERCISES: CPT

## 2024-07-29 PROCEDURE — 97140 MANUAL THERAPY 1/> REGIONS: CPT

## 2024-07-29 PROCEDURE — 97112 NEUROMUSCULAR REEDUCATION: CPT

## 2024-07-29 NOTE — PROGRESS NOTES
"Daily Note     Today's date: 2024  Patient name: Bill Schultz  : 1955  MRN: [id number]  Referring provider: Jorge Dacosta MD  Dx:   Encounter Diagnosis     ICD-10-CM    1. Lumbar radiculopathy  M54.16                      Subjective: Pt reports no new complaints      Objective: See treatment diary below      Assessment:  Pt tolerated treatment session well. Lumbar ROM and LE flexibility progressing well towards goals. Demonstrates good exercise form with min verbal cues, demonstrates fatigue post session. Overall progressing towards goals. Pt would benefit from continued OP PT services in order to address ongoing impairments and improve functional activity limitations.    Plan: Continue per plan of care.      Precautions:  Right Hand Amputation   POC 24      Manuals 7/29 7/15 7/18 7/23 7/26   Lumbar PA mobs 5' 5 min  5' 5' 5'   Bilateral HS, hip ABD, piriformis, quad and gastroc with manual lumbar traction  10' 15 min  10' 10' 10'                   Neuro Re-Ed      Stand lumbar extension against wall  2x10 5\"hold 2x10   5\" Hold  2x10 5\"hold 2x10 5\"hold 2x10 5\"hold   Stand OAL  Prone 2x10 bilat Prone   10x Bilat  Prone 2x10 bilat Prone 2x10 bilat Prone 2x10 bilat   TB Trunk rotation         Supine Tball ABD crunch  2x10 3\"hold 2x10   3\" Hold  2x10 3\"hold 2x10 3\"hold 2x10 3\"hold   Seated Tball Trunk Rotation and PNF         Bridge with ABD  2x10 Blue 2x10 Blue  2x10 Blue 2x10 Blue 2x10 Blue   BOSU Bridge         Ther Ex      Treadmill for ROM and strength  10' 3.2MPH 10 min   1.5-2.5 MPH  10' 3.0MPH 10' 3.1MPH 10' 3.2MPH   Supine Tball LTR  15x5\" bilat 10x5\" Hold Bilat  10x5\" bilat 15x5\" bilat 15x5\" bilat   Lunge walk         Side step and squat         Leg press DL and SL  NT NT  165#DL  85#SL  2x10ea 185#DL  95#SL  2x10ea 185#DL  95#SL  2x10ea                   HEP update/review         Ther Activity                      Gait Training        "               Modalities   7/18 7/23 7/26   MHP  CP 15 min LB in seated

## 2024-07-30 ENCOUNTER — HOSPITAL ENCOUNTER (OUTPATIENT)
Dept: CT IMAGING | Facility: HOSPITAL | Age: 69
Discharge: HOME | End: 2024-07-30
Attending: INTERNAL MEDICINE
Payer: COMMERCIAL

## 2024-07-30 ENCOUNTER — HOSPITAL ENCOUNTER (OUTPATIENT)
Dept: ULTRASOUND IMAGING | Facility: HOSPITAL | Age: 69
Discharge: HOME | End: 2024-07-30
Attending: INTERNAL MEDICINE
Payer: COMMERCIAL

## 2024-07-30 DIAGNOSIS — E04.9 GOITER: ICD-10-CM

## 2024-07-30 DIAGNOSIS — K86.9 LESION OF PANCREAS: ICD-10-CM

## 2024-07-30 PROCEDURE — 74177 CT ABD & PELVIS W/CONTRAST: CPT

## 2024-07-30 PROCEDURE — 76536 US EXAM OF HEAD AND NECK: CPT

## 2024-07-30 RX ADMIN — IOHEXOL 100 ML: 350 INJECTION, SOLUTION INTRAVENOUS at 07:46

## 2024-07-31 ENCOUNTER — TELEPHONE (OUTPATIENT)
Dept: FAMILY MEDICINE CLINIC | Facility: CLINIC | Age: 69
End: 2024-07-31

## 2024-07-31 DIAGNOSIS — E04.1 LEFT THYROID NODULE: Primary | ICD-10-CM

## 2024-07-31 NOTE — TELEPHONE ENCOUNTER
I spoke with Santo about his imaging and lab results:  CT abdomen/pelvis did not reveal a mass or cystic lesion in the left upper quadrant as seen on MRI LS spine. Checking with radiologist to see if any further follow up scanning is needed.  Thyroid US shows multiple nodules and one on left lower pole meets criteria for biopsy. TSH is normal. I ordered the thyroid biopsy.  Labs were only remarkable for glucose of 121 with A1c 5.7%. Weight loss will be important to prevent development of diabetes.

## 2024-08-01 ENCOUNTER — OFFICE VISIT (OUTPATIENT)
Dept: PHYSICAL THERAPY | Facility: CLINIC | Age: 69
End: 2024-08-01
Payer: COMMERCIAL

## 2024-08-01 DIAGNOSIS — M54.16 LUMBAR RADICULOPATHY: Primary | ICD-10-CM

## 2024-08-01 PROCEDURE — 97110 THERAPEUTIC EXERCISES: CPT

## 2024-08-01 PROCEDURE — 97112 NEUROMUSCULAR REEDUCATION: CPT

## 2024-08-01 PROCEDURE — 97140 MANUAL THERAPY 1/> REGIONS: CPT

## 2024-08-01 NOTE — PROGRESS NOTES
"Daily Note     Today's date: 2024  Patient name: Bill Schultz  : 1955  MRN: [id number]  Referring provider: Jorge Dacosta MD  Dx:   Encounter Diagnosis     ICD-10-CM    1. Lumbar radiculopathy  M54.16                      Subjective: Patient reports that he is doing well today.      Objective: See treatment diary below      Assessment: Tolerated treatment well. Patient exhibited good technique with therapeutic exercises and would benefit from continued PT to increase ROM/strength and endurance to improve his mobility.      Plan: Continue per plan of care.      Precautions:  Right Hand Amputation   POC 24      Manuals    Lumbar PA mobs 5' 5'  5' 5'   Bilateral HS, hip ABD, piriformis, quad and gastroc with manual lumbar traction  10' 10'  10' 10'                   Neuro Re-Ed     Stand lumbar extension against wall  2x10 5\"hold 2x10 5\"hold  2x10 5\"hold 2x10 5\"hold   Stand OAL  Prone 2x10 bilat Prone 2x10 bilat  Prone 2x10 bilat Prone 2x10 bilat   TB Trunk rotation         Supine Tball ABD crunch  2x10 3\"hold 2x10 3\"hold  2x10 3\"hold 2x10 3\"hold   Seated Tball Trunk Rotation and PNF         Bridge with ABD  2x10 Blue 2x10 Blue  2x10 Blue 2x10 Blue   BOSU Bridge         Ther Ex     Treadmill for ROM and strength  10' 3.2MPH 10' 3.3MPH  10' 3.1MPH 10' 3.2MPH   Supine Tball LTR  15x5\" bilat 15x5\" bilat  15x5\" bilat 15x5\" bilat   Lunge walk         Side step and squat         Leg press DL and SL  #DL  105#SL  2x10ea  185#DL  95#SL  2x10ea 185#DL  95#SL  2x10ea                   HEP update/review         Ther Activity                     Gait Training                     Modalities     MHP                                   "

## 2024-08-02 NOTE — PROGRESS NOTES
"Daily Note     Today's date: 2024  Patient name: Bill Schultz  : 1955  MRN: [id number]  Referring provider: Jorge Dacosta MD  Dx:   Encounter Diagnosis     ICD-10-CM    1. Lumbar radiculopathy  M54.16                      Subjective: Pt notes no new complaints      Objective: See treatment diary below      Assessment:  Pt tolerated treatment session well. Increased resistance for bridges without issue. Overall progressing towards goals. Pt would benefit from continued OP PT services in order to address ongoing impairments and improve functional activity limitations.       Plan: Continue per plan of care.      Precautions:  Right Hand Amputation   POC 24      Manuals    Lumbar PA mobs 5' 5' 5' 5' 5'   Bilateral HS, hip ABD, piriformis, quad and gastroc with manual lumbar traction  10' 10' 10' 10' 10'                   Neuro Re-Ed     Stand lumbar extension against wall  2x10 5\"hold 2x10 5\"hold 2x10 5\"hold 2x10 5\"hold 2x10 5\"hold   Stand OAL  Prone 2x10 bilat Prone 2x10 bilat Prone 2x10 bilat Prone 2x10 bilat Prone 2x10 bilat   TB Trunk rotation         Supine Tball ABD crunch  2x10 3\"hold 2x10 3\"hold 2x10 3\"hold 2x10 3\"hold 2x10 3\"hold   Seated Tball Trunk Rotation and PNF         Bridge with ABD  2x10 Blue 2x10 Blue 2x10 Black 2x10 Blue 2x10 Blue   BOSU Bridge         Ther Ex     Treadmill for ROM and strength  10' 3.2MPH 10' 3.3MPH 10' 3.3MPH 10' 3.1MPH 10' 3.2MPH   Supine Tball LTR  15x5\" bilat 15x5\" bilat 15x5\" bilat 15x5\" bilat 15x5\" bilat   Lunge walk         Side step and squat         Leg press DL and SL  #DL  105#SL  2x10ea 205#DL  105#SL  2x10ea 185#DL  95#SL  2x10ea 185#DL  95#SL  2x10ea                   HEP update/review         Ther Activity                     Gait Training                     Modalities     MHP                                     "

## 2024-08-05 ENCOUNTER — OFFICE VISIT (OUTPATIENT)
Dept: PHYSICAL THERAPY | Facility: CLINIC | Age: 69
End: 2024-08-05
Payer: COMMERCIAL

## 2024-08-05 DIAGNOSIS — M54.16 LUMBAR RADICULOPATHY: Primary | ICD-10-CM

## 2024-08-05 PROCEDURE — 97140 MANUAL THERAPY 1/> REGIONS: CPT

## 2024-08-05 PROCEDURE — 97110 THERAPEUTIC EXERCISES: CPT

## 2024-08-05 PROCEDURE — 97112 NEUROMUSCULAR REEDUCATION: CPT

## 2024-08-05 NOTE — PRE-PROCEDURE INSTRUCTIONS
Call placed to patient to discuss upcoming thyroid bx @ [facility]. Allergies reviewed and verified patient currently takes baby ASA. Pre-procedure instructions discussed with patient. Patient instructed he may eat normally and take medications as usual before the procedure. Procedure and post procedure expectations and instructions reviewed.  Reminder given to patient of appointment date and time of 8/8/24 @ 1400 with 1345 arrival time. Patient verbalized understanding and denied any questions at this time.

## 2024-08-08 ENCOUNTER — APPOINTMENT (OUTPATIENT)
Dept: PHYSICAL THERAPY | Facility: CLINIC | Age: 69
End: 2024-08-08
Payer: COMMERCIAL

## 2024-08-08 ENCOUNTER — HOSPITAL ENCOUNTER (OUTPATIENT)
Dept: ULTRASOUND IMAGING | Facility: HOSPITAL | Age: 69
End: 2024-08-08
Attending: INTERNAL MEDICINE
Payer: COMMERCIAL

## 2024-08-08 DIAGNOSIS — E04.1 LEFT THYROID NODULE: ICD-10-CM

## 2024-08-08 PROCEDURE — 88333 PATH CONSLTJ SURG CYTO XM 1: CPT | Performed by: PATHOLOGY

## 2024-08-08 PROCEDURE — 88173 CYTOPATH EVAL FNA REPORT: CPT | Performed by: PATHOLOGY

## 2024-08-08 PROCEDURE — 10005 FNA BX W/US GDN 1ST LES: CPT

## 2024-08-08 PROCEDURE — 88172 CYTP DX EVAL FNA 1ST EA SITE: CPT | Performed by: PATHOLOGY

## 2024-08-08 RX ORDER — LIDOCAINE HYDROCHLORIDE 10 MG/ML
5 INJECTION, SOLUTION EPIDURAL; INFILTRATION; INTRACAUDAL; PERINEURAL ONCE
Status: COMPLETED | OUTPATIENT
Start: 2024-08-08 | End: 2024-08-08

## 2024-08-08 RX ADMIN — LIDOCAINE HYDROCHLORIDE 5 ML: 10 INJECTION, SOLUTION EPIDURAL; INFILTRATION; INTRACAUDAL; PERINEURAL at 14:24

## 2024-08-09 ENCOUNTER — OFFICE VISIT (OUTPATIENT)
Dept: PHYSICAL THERAPY | Facility: CLINIC | Age: 69
End: 2024-08-09
Payer: COMMERCIAL

## 2024-08-09 DIAGNOSIS — M54.16 LUMBAR RADICULOPATHY: Primary | ICD-10-CM

## 2024-08-09 PROCEDURE — 97110 THERAPEUTIC EXERCISES: CPT

## 2024-08-09 PROCEDURE — 97112 NEUROMUSCULAR REEDUCATION: CPT

## 2024-08-09 PROCEDURE — 97140 MANUAL THERAPY 1/> REGIONS: CPT

## 2024-08-09 NOTE — PROGRESS NOTES
"Daily Note     Today's date: 2024  Patient name: Bill Schultz  : 1955  MRN: [id number]  Referring provider: Jorge Dacosta MD  Dx:   Encounter Diagnosis     ICD-10-CM    1. Lumbar radiculopathy  M54.16                      Subjective: Patient reports that he is feeling good this morning.  \"I was real sore the past few days.  I don't know if it was the new aerobic exercises I started at the gym or the swimming I did, but I had it down my R leg and everything.  But I'm feeling good today.\"      Objective: See treatment diary below      Assessment: Tolerated treatment well. Patient exhibited good technique with therapeutic exercises and would benefit from continued PT to increase ROM/strength and endurance to improve mobility.      Plan: Continue per plan of care.      Precautions:  Right Hand Amputation   POC 24      Manuals     Lumbar PA mobs 5' 5' 5' 5'    Bilateral HS, hip ABD, piriformis, quad and gastroc with manual lumbar traction  10' 10' 10' 10'                    Neuro Re-Ed      Stand lumbar extension against wall  2x10 5\"hold 2x10 5\"hold 2x10 5\"hold 2x10 5\"hold    Stand OAL  Prone 2x10 bilat Prone 2x10 bilat Prone 2x10 bilat Prone 2x10 bilat    TB Trunk rotation         Supine Tball ABD crunch  2x10 3\"hold 2x10 3\"hold 2x10 3\"hold 2x10 3\"hold    Seated Tball Trunk Rotation and PNF         Bridge with ABD  2x10 Blue 2x10 Blue 2x10 Black 2x10 Black    BOSU Bridge         Ther Ex      Treadmill for ROM and strength  10' 3.2MPH 10' 3.3MPH 10' 3.3MPH 10' 3.3MPH    Supine Tball LTR  15x5\" bilat 15x5\" bilat 15x5\" bilat 15x5\" bilat    Lunge walk         Side step and squat         Leg press DL and SL  #DL  105#SL  2x10ea 205#DL  105#SL  2x10ea 205#DL  105#SL  2x10ea                    HEP update/review         Ther Activity                      Gait Training                      Modalities      MHP                                       "

## 2024-08-12 ENCOUNTER — OFFICE VISIT (OUTPATIENT)
Dept: PHYSICAL THERAPY | Facility: CLINIC | Age: 69
End: 2024-08-12
Payer: COMMERCIAL

## 2024-08-12 DIAGNOSIS — M54.16 LUMBAR RADICULOPATHY: Primary | ICD-10-CM

## 2024-08-12 PROCEDURE — 97112 NEUROMUSCULAR REEDUCATION: CPT

## 2024-08-12 PROCEDURE — 97140 MANUAL THERAPY 1/> REGIONS: CPT

## 2024-08-12 PROCEDURE — 97110 THERAPEUTIC EXERCISES: CPT

## 2024-08-12 PROCEDURE — 88173 CYTOPATH EVAL FNA REPORT: CPT | Performed by: PATHOLOGY

## 2024-08-12 PROCEDURE — 88172 CYTP DX EVAL FNA 1ST EA SITE: CPT | Performed by: PATHOLOGY

## 2024-08-12 NOTE — PROGRESS NOTES
"Daily Note     Today's date: 2024  Patient name: Bill Schultz  : 1955  MRN: [id number]  Referring provider: Jorge Dacosta MD  Dx:   Encounter Diagnosis     ICD-10-CM    1. Lumbar radiculopathy  M54.16                      Subjective: Pt reports increasing R hip pain      Objective: See treatment diary below      Assessment:  Pt tolerated treatment session fairly well. He notes increasing R hip pain, responding well to R hip mobz and 1.2 kneeling stretch. Overall progressing towards goals. Pt would benefit from continued OP PT services in order to address ongoing impairments and improve functional activity limitations.       Plan: Continue per plan of care.      Precautions:  Right Hand Amputation   POC 24      Manuals    Lumbar PA mobs 5' 5' 5' 5' 5'   Bilateral HS, hip ABD, piriformis, quad and gastroc with manual lumbar traction  10' 10' 10' 10' 10' RLE + hip mobz                   Neuro Re-Ed      Stand lumbar extension against wall  2x10 5\"hold 2x10 5\"hold 2x10 5\"hold 2x10 5\"hold 2x10 5\"hold   Stand OAL  Prone 2x10 bilat Prone 2x10 bilat Prone 2x10 bilat Prone 2x10 bilat Prone 2x10 bilat   TB Trunk rotation         Supine Tball ABD crunch  2x10 3\"hold 2x10 3\"hold 2x10 3\"hold 2x10 3\"hold 2x10 3\"hold   Seated Tball Trunk Rotation and PNF         Bridge with ABD  2x10 Blue 2x10 Blue 2x10 Black 2x10 Black 2x10 Black   BOSU Bridge         Ther Ex      Treadmill for ROM and strength  10' 3.2MPH 10' 3.3MPH 10' 3.3MPH 10' 3.3MPH 10' 3.3MPH   Supine Tball LTR  15x5\" bilat 15x5\" bilat 15x5\" bilat 15x5\" bilat 15x5\" bilat   Lunge walk      1/2 kneeling hip flexor stretch :15x5 bilat   Side step and squat         Leg press DL and SL  #DL  105#SL  2x10ea 205#DL  105#SL  2x10ea 205#DL  105#SL  2x10ea 205#DL  105#SL  2x10ea                   HEP update/review      5'   Ther Activity                      Gait Training                      Modalities  " 8/1 8/9    [facility]

## 2024-08-13 NOTE — PROGRESS NOTES
"Daily Note     Today's date: 2024  Patient name: Bill Schultz  : 1955  MRN: [id number]  Referring provider: Jorge Dacosta MD  Dx:   Encounter Diagnosis     ICD-10-CM    1. Lumbar radiculopathy  M54.16                      Subjective: ***      Objective: See treatment diary below      Assessment: Tolerated treatment {Tolerated treatment :[id number]}. Patient exhibited good technique with therapeutic exercises and would benefit from continued PT to increase ROM/strength and endurance to improve his overall mobility.      Plan: Continue per plan of care.      Precautions:  Right Hand Amputation   POC 24      Manuals 8/1 8/5 8/9 8/12 8/15   Lumbar PA mobs 5' 5' 5' 5'    Bilateral HS, hip ABD, piriformis, quad and gastroc with manual lumbar traction  10' 10' 10' 10' RLE + hip mobz                    Neuro Re-Ed 8/1  8/9  8/15   Stand lumbar extension against wall  2x10 5\"hold 2x10 5\"hold 2x10 5\"hold 2x10 5\"hold    Stand OAL  Prone 2x10 bilat Prone 2x10 bilat Prone 2x10 bilat Prone 2x10 bilat    TB Trunk rotation         Supine Tball ABD crunch  2x10 3\"hold 2x10 3\"hold 2x10 3\"hold 2x10 3\"hold    Seated Tball Trunk Rotation and PNF         Bridge with ABD  2x10 Blue 2x10 Black 2x10 Black 2x10 Black    BOSU Bridge         Ther Ex 8/1  8/9  8/15   Treadmill for ROM and strength  10' 3.3MPH 10' 3.3MPH 10' 3.3MPH 10' 3.3MPH    Supine Tball LTR  15x5\" bilat 15x5\" bilat 15x5\" bilat 15x5\" bilat    Lunge walk     1/2 kneeling hip flexor stretch :15x5 bilat    Side step and squat         Leg press DL and SL  205#DL  105#SL  2x10ea 205#DL  105#SL  2x10ea 205#DL  105#SL  2x10ea 205#DL  105#SL  2x10ea                    HEP update/review     5'    Ther Activity 8/1  8/9  8/15                   Gait Training 8/1  8/9  8/15                   Modalities 8/1  8/9  8/15   MHP                                           "

## 2024-08-14 ENCOUNTER — TELEPHONE (OUTPATIENT)
Dept: FAMILY MEDICINE CLINIC | Facility: CLINIC | Age: 69
End: 2024-08-14

## 2024-08-14 DIAGNOSIS — Z80.0 FAMILY HISTORY OF COLON CANCER IN MOTHER: Primary | ICD-10-CM

## 2024-08-14 DIAGNOSIS — E04.1 LEFT THYROID NODULE: ICD-10-CM

## 2024-08-14 NOTE — TELEPHONE ENCOUNTER
I spoke with Santo and let him know that his thyroid biopsy was benign ([place] grade II). He will need a follow up thyroid US in 12 months.     I was able to pull in his last colonoscopy at [facility] in [place], [place] which was performed in 2013.  He is overdue for colonoscopy and I put in a referral to GI for him.

## 2024-08-15 ENCOUNTER — APPOINTMENT (OUTPATIENT)
Dept: PHYSICAL THERAPY | Facility: CLINIC | Age: 69
End: 2024-08-15
Payer: COMMERCIAL

## 2024-08-15 DIAGNOSIS — M54.16 LUMBAR RADICULOPATHY: Primary | ICD-10-CM

## 2024-08-15 NOTE — PROGRESS NOTES
PT Discharge    Today's date: 2024  Patient name: Bill Schultz  : 1955  MRN: [id number]  Referring provider: Jorge Dacosta MD  Dx:   Encounter Diagnosis     ICD-10-CM    1. Lumbar radiculopathy  M54.16                      Assessment  Impairments: abnormal or restricted ROM, activity intolerance, impaired physical strength, lacks appropriate home exercise program, pain with function and activity limitations  Barriers to therapy: PT notes the patient with increase ROM and strength t/o the lumbar spine as well as the bilateral hip and LE with improved trunk/core strength so PT notes the patient has met all therapy goals and is ready for a transition to HEP.   Understanding of Dx/Px/POC: good     Prognosis: good    Goals  ST.  Initiate HEP-MET  2.  Decrease symptoms by 25-50%-MET  3.  Increase ROM by 25-50%-MET   4.  Increase strength by 1-2 mm grades-MET  LT.  Improve spinal stability with increase trunk/core strength-MET   2.  No LE radicular symptoms-MET  3.  Decrease limitations with standing, walking and stairs-MET  4.  Decrease limitations with trunk movement, lifting, and carrying-MET  5.  Decrease limitations with transfers and ADL-MET  6.  DC with HEP-MET      Plan  Patient would benefit from: skilled physical therapy  Planned modality interventions: thermotherapy: hydrocollator packs    Planned therapy interventions: manual therapy, neuromuscular re-education, postural training, patient/caregiver education, self care, strengthening, stretching, balance, flexibility, gait training, graded exercise, home exercise program and therapeutic exercise    Frequency: 2x week  Duration in weeks: 1  Treatment plan discussed with: patient  Plan details: Transition to HEP.        Subjective Evaluation    History of Present Illness  Mechanism of injury: Presently the patient has attended multiple sessions of skilled therapy and feels 80-90% improvement since the start of therapy.  Patient reports  the radicular symptoms in the right LE have decreased with improved ROM and strength in the lumbar spine and core/trunk.  Patient reports he has returned to recreational activities with minimal to no limitations.  Patient reports he is happy with current status and ready for a transition to [facility].   Patient Goals  Patient goals for therapy: decreased pain, increased motion, increased strength, independence with ADLs/IADLs and return to sport/leisure activities    Pain  Current pain ratin  At best pain ratin  At worst pain rating: 3  Location: Right LE  Quality: radiating, tight, pulling, discomfort, dull ache and burning  Progression: improved    Treatments  Current treatment: medication and physical therapy        Objective     Palpation   Left   Tenderness of the erector spinae and lumbar paraspinals.     Right   Tenderness of the erector spinae and lumbar paraspinals.     Tenderness     Left Hip   No tenderness in the PSIS, greater trochanter, iliac crest and sacroiliac joint.     Right Hip   No tenderness in the PSIS, greater trochanter, iliac crest and sacroiliac joint.     Neurological Testing     Reflexes   Left   Patellar (L4): normal (2+)  Achilles (S1): normal (2+)    Right   Patellar (L4): normal (2+)  Achilles (S1): normal (2+)    Active Range of Motion     Lumbar   Flexion: 65 degrees   Extension: 23 degrees   Left lateral flexion:  WFL  Right lateral flexion:  WFL  Left rotation: 14 degrees   Right rotation: 13 degrees   Left Hip   Flexion: 62 degrees   Extension: 5 degrees   Abduction: WFL  External rotation (90/90): 15 degrees   Internal rotation (90/90): 11 degrees     Right Hip   Flexion: 65 degrees   Extension: 5 degrees   Abduction: WFL  External rotation (90/90): 16 degrees   Internal rotation (90/90): 10 degrees     Additional Active Range of Motion Details  Trunk/core strength with abdominals of 4/5 and lumbar paraspinals of 4/5   PT notes improved ROM and strength t/o the bilateral hip  and LE     Passive Range of Motion   Left Hip   Flexion: 65 degrees   Extension: 5 degrees   Abduction: WFL  External rotation (90/90): 22 degrees   Internal rotation (90/90): 15 degrees     Right Hip   Flexion: 67 degrees   Extension: 5 degrees   Abduction: WFL  External rotation (90/90): 23 degrees   Internal rotation (90/90): 15 degrees     Joint Play     Hypomobile: L1, L2, L3, L4, L5 and S1     Pain: L4, L5 and S1     Strength/Myotome Testing     Left Hip   Planes of Motion   Flexion: 5  Extension: 5  Abduction: 5  Adduction: 5  External rotation: 5  Internal rotation: 5    Right Hip   Planes of Motion   Flexion: 5  Extension: 5  Abduction: 5  Adduction: 5  External rotation: 5  Internal rotation: 5    Tests     Lumbar     Left   Negative crossed SLR.     Right   Negative crossed SLR.     Left Hip   Negative [place], FADIR and long sit.   Zackary: Negative.   Modified Zackary: Negative.   90/90 SLR: Negative.   SLR: Negative.     Right Hip   Negative [place], FADIR and long sit.   Zackary: Negative.    Modified Zackary: Negative.   90/90 SLR: Negative.  SLR: Negative.     Ambulation     Observational Gait   Gait: within functional limits   Walking speed and stride length within functional limits.              Precautions:  Right Hand Amputation   POC 8/26/24

## 2024-08-16 ENCOUNTER — OFFICE VISIT (OUTPATIENT)
Dept: PHYSICAL THERAPY | Facility: CLINIC | Age: 69
End: 2024-08-16
Payer: COMMERCIAL

## 2024-08-16 DIAGNOSIS — M54.16 LUMBAR RADICULOPATHY: Primary | ICD-10-CM

## 2024-08-16 PROCEDURE — 97140 MANUAL THERAPY 1/> REGIONS: CPT | Performed by: PHYSICIAN ASSISTANT

## 2024-08-16 PROCEDURE — 97535 SELF CARE MNGMENT TRAINING: CPT

## 2024-08-16 PROCEDURE — 97110 THERAPEUTIC EXERCISES: CPT

## 2024-08-16 PROCEDURE — 97140 MANUAL THERAPY 1/> REGIONS: CPT

## 2024-08-16 NOTE — PROGRESS NOTES
"Daily Note     Today's date: 2024  Patient name: Bill Schultz  : 1955  MRN: [id number]  Referring provider: Jorge Dacosta MD  Dx:   Encounter Diagnosis     ICD-10-CM    1. Lumbar radiculopathy  M54.16                      Subjective: Patient reports that he is very pleased with the progress he's made with his OP PT.      Objective: See treatment diary below      Assessment: Tolerated treatment well. Patient to transition from skilled rehab to HEP today.  Patient educated on and demonstrated a good understanding of his HEP.  Patient compliance with his HEP will assist him in maintaining the gains he's made in OP PT.      Plan: Patient discharged to [facility] at this time.     Precautions:  Right Hand Amputation   POC 24      Manuals    Lumbar PA mobs 5' 5' 5' 5'    Bilateral HS, hip ABD, piriformis, quad and gastroc with manual lumbar traction  10' 10' 10' 10' RLE + hip mobz 15' bilat & R hip mobs                   Neuro Re-Ed    Stand lumbar extension against wall  2x10 5\"hold 2x10 5\"hold 2x10 5\"hold 2x10 5\"hold    Stand OAL  Prone 2x10 bilat Prone 2x10 bilat Prone 2x10 bilat Prone 2x10 bilat    TB Trunk rotation         Supine Tball ABD crunch  2x10 3\"hold 2x10 3\"hold 2x10 3\"hold 2x10 3\"hold    Seated Tball Trunk Rotation and PNF         Bridge with ABD  2x10 Blue 2x10 Black 2x10 Black 2x10 Black    BOSU Bridge         Ther Ex    Treadmill for ROM and strength  10' 3.3MPH 10' 3.3MPH 10' 3.3MPH 10' 3.3MPH 10' 3.3MPH   Supine Tball LTR  15x5\" bilat 15x5\" bilat 15x5\" bilat 15x5\" bilat    Lunge walk     1/2 kneeling hip flexor stretch :15x5 bilat    Side step and squat         Leg press DL and SL  205#DL  105#SL  2x10ea 205#DL  105#SL  2x10ea 205#DL  105#SL  2x10ea 205#DL  105#SL  2x10ea                    HEP update/review     5' 20'   Ther Activity                    Gait Training                    Modalities  "   MOLLY

## 2024-08-16 NOTE — LETTER
2024    Jorge Dacosta MD  [address]  [address]  [facility] [address]    Patient: Bill Schultz   YOB: 1955   Date of Visit: 2024     Encounter Diagnosis     ICD-10-CM    1. Lumbar radiculopathy  M54.16           Dear Dr. Dacosta:    Thank you for your recent referral of Bill Schultz. Please review the attached discharge summary from Bill's recent visit.     Please verify that you agree with the plan of care by signing the attached order.     If you have any questions or concerns, please do not hesitate to call.     I sincerely appreciate the opportunity to share in the care of one of your patients and hope to have another opportunity to work with you in the near future.       Sincerely,    Vahe Ballesteros, PT      Referring Provider:      I certify that I have read the below Plan of Care and certify the need for these services furnished under this plan of treatment while under my care.                    Jorge Dacosta MD  [address]  [address]  [facility] [address]  Via Fax: [phone]          PT Discharge    Today's date: 2024  Patient name: Bill Schultz  : 1955  MRN: [id number]  Referring provider: Jorge Dacosta MD  Dx:   Encounter Diagnosis     ICD-10-CM    1. Lumbar radiculopathy  M54.16                      Assessment  Impairments: abnormal or restricted ROM, activity intolerance, impaired physical strength, lacks appropriate home exercise program, pain with function and activity limitations  Barriers to therapy: PT notes the patient with increase ROM and strength t/o the lumbar spine as well as the bilateral hip and LE with improved trunk/core strength so PT notes the patient has met all therapy goals and is ready for a transition to HEP.   Understanding of Dx/Px/POC: good     Prognosis: good    Goals  ST.  Initiate HEP-MET  2.  Decrease symptoms by 25-50%-MET  3.  Increase ROM by 25-50%-MET   4.  Increase strength by 1-2 mm  grades-MET  LT.  Improve spinal stability with increase trunk/core strength-MET   2.  No LE radicular symptoms-MET  3.  Decrease limitations with standing, walking and stairs-MET  4.  Decrease limitations with trunk movement, lifting, and carrying-MET  5.  Decrease limitations with transfers and ADL-MET  6.  DC with HEP-MET      Plan  Patient would benefit from: skilled physical therapy  Planned modality interventions: thermotherapy: hydrocollator packs    Planned therapy interventions: manual therapy, neuromuscular re-education, postural training, patient/caregiver education, self care, strengthening, stretching, balance, flexibility, gait training, graded exercise, home exercise program and therapeutic exercise    Frequency: 2x week  Duration in weeks: 1  Treatment plan discussed with: patient  Plan details: Transition to HEP.        Subjective Evaluation    History of Present Illness  Mechanism of injury: Presently the patient has attended multiple sessions of skilled therapy and feels 80-90% improvement since the start of therapy.  Patient reports the radicular symptoms in the right LE have decreased with improved ROM and strength in the lumbar spine and core/trunk.  Patient reports he has returned to recreational activities with minimal to no limitations.  Patient reports he is happy with current status and ready for a transition to [facility].   Patient Goals  Patient goals for therapy: decreased pain, increased motion, increased strength, independence with ADLs/IADLs and return to sport/leisure activities    Pain  Current pain ratin  At best pain ratin  At worst pain rating: 3  Location: Right LE  Quality: radiating, tight, pulling, discomfort, dull ache and burning  Progression: improved    Treatments  Current treatment: medication and physical therapy        Objective     Palpation   Left   Tenderness of the erector spinae and lumbar paraspinals.     Right   Tenderness of the erector spinae and lumbar  paraspinals.     Tenderness     Left Hip   No tenderness in the PSIS, greater trochanter, iliac crest and sacroiliac joint.     Right Hip   No tenderness in the PSIS, greater trochanter, iliac crest and sacroiliac joint.     Neurological Testing     Reflexes   Left   Patellar (L4): normal (2+)  Achilles (S1): normal (2+)    Right   Patellar (L4): normal (2+)  Achilles (S1): normal (2+)    Active Range of Motion     Lumbar   Flexion: 65 degrees   Extension: 23 degrees   Left lateral flexion:  WFL  Right lateral flexion:  WFL  Left rotation: 14 degrees   Right rotation: 13 degrees   Left Hip   Flexion: 62 degrees   Extension: 5 degrees   Abduction: WFL  External rotation (90/90): 15 degrees   Internal rotation (90/90): 11 degrees     Right Hip   Flexion: 65 degrees   Extension: 5 degrees   Abduction: WFL  External rotation (90/90): 16 degrees   Internal rotation (90/90): 10 degrees     Additional Active Range of Motion Details  Trunk/core strength with abdominals of 4/5 and lumbar paraspinals of 4/5   PT notes improved ROM and strength t/o the bilateral hip and LE     Passive Range of Motion   Left Hip   Flexion: 65 degrees   Extension: 5 degrees   Abduction: WFL  External rotation (90/90): 22 degrees   Internal rotation (90/90): 15 degrees     Right Hip   Flexion: 67 degrees   Extension: 5 degrees   Abduction: WFL  External rotation (90/90): 23 degrees   Internal rotation (90/90): 15 degrees     Joint Play     Hypomobile: L1, L2, L3, L4, L5 and S1     Pain: L4, L5 and S1     Strength/Myotome Testing     Left Hip   Planes of Motion   Flexion: 5  Extension: 5  Abduction: 5  Adduction: 5  External rotation: 5  Internal rotation: 5    Right Hip   Planes of Motion   Flexion: 5  Extension: 5  Abduction: 5  Adduction: 5  External rotation: 5  Internal rotation: 5    Tests     Lumbar     Left   Negative crossed SLR.     Right   Negative crossed SLR.     Left Hip   Negative [place], FADIR and long sit.   Zackary: Negative.  "  Modified Zackary: Negative.    SLR: Negative.   SLR: Negative.     Right Hip   Negative [place], FADIR and long sit.   Zackary: Negative.    Modified Zackary: Negative.    SLR: Negative.  SLR: Negative.     Ambulation     Observational Gait   Gait: within functional limits   Walking speed and stride length within functional limits.              Precautions:  Right Hand Amputation   POC 24          Daily Note     Today's date: 2024  Patient name: Bill Schultz  : 1955  MRN: [id number]  Referring provider: Jorge Dacosta MD  Dx:   Encounter Diagnosis     ICD-10-CM    1. Lumbar radiculopathy  M54.16                      Subjective: Patient reports that he is very pleased with the progress he's made with his OP PT.      Objective: See treatment diary below      Assessment: Tolerated treatment well. Patient to transition from skilled rehab to HEP today.  Patient educated on and demonstrated a good understanding of his HEP.  Patient compliance with his HEP will assist him in maintaining the gains he's made in OP PT.      Plan: Patient discharged to [facility] at this time.     Precautions:  Right Hand Amputation   POC 24      Manuals    Lumbar PA mobs 5' 5' 5' 5'    Bilateral HS, hip ABD, piriformis, quad and gastroc with manual lumbar traction  10' 10' 10' 10' RLE + hip mobz 15' bilat & R hip mobs                   Neuro Re-Ed    Stand lumbar extension against wall  2x10 5\"hold 2x10 5\"hold 2x10 5\"hold 2x10 5\"hold    Stand OAL  Prone 2x10 bilat Prone 2x10 bilat Prone 2x10 bilat Prone 2x10 bilat    TB Trunk rotation         Supine Tball ABD crunch  2x10 3\"hold 2x10 3\"hold 2x10 3\"hold 2x10 3\"hold    Seated Tball Trunk Rotation and PNF         Bridge with ABD  2x10 Blue 2x10 Black 2x10 Black 2x10 Black    BOSU Bridge         Ther Ex    Treadmill for ROM and strength  10' 3.3MPH 10' 3.3MPH 10' 3.3MPH 10' 3.3MPH 10' 3.3MPH   Supine Tball LTR  15x5\" bilat " "15x5\" bilat 15x5\" bilat 15x5\" bilat    Lunge walk     1/2 kneeling hip flexor stretch :15x5 bilat    Side step and squat         Leg press DL and SL  205#DL  105#SL  2x10ea 205#DL  105#SL  2x10ea 205#DL  105#SL  2x10ea 205#DL  105#SL  2x10ea                    HEP update/review     5' 20'   Ther Activity 8/1 8/9 8/16                   Gait Training 8/1 8/9 8/16                   Modalities 8/1 8/9 8/16   MHP                                             Attestation signed by Vahe Ballesteros, PT at 8/16/2024 11:09 AM:  I supervised the visit.  We discussed the case to ensure appropriate continuation and progression of care and I reviewed the documentation.                   "

## 2024-08-19 ENCOUNTER — APPOINTMENT (OUTPATIENT)
Dept: PHYSICAL THERAPY | Facility: CLINIC | Age: 69
End: 2024-08-19
Payer: COMMERCIAL

## 2024-08-20 ENCOUNTER — APPOINTMENT (OUTPATIENT)
Dept: PHYSICAL THERAPY | Facility: CLINIC | Age: 69
End: 2024-08-20
Payer: COMMERCIAL

## 2024-08-22 ENCOUNTER — APPOINTMENT (OUTPATIENT)
Dept: PHYSICAL THERAPY | Facility: CLINIC | Age: 69
End: 2024-08-22
Payer: COMMERCIAL

## 2024-08-26 ENCOUNTER — APPOINTMENT (OUTPATIENT)
Dept: PHYSICAL THERAPY | Facility: CLINIC | Age: 69
End: 2024-08-26
Payer: COMMERCIAL

## 2024-08-29 ENCOUNTER — APPOINTMENT (OUTPATIENT)
Dept: PHYSICAL THERAPY | Facility: CLINIC | Age: 69
End: 2024-08-29
Payer: COMMERCIAL

## 2025-01-17 ENCOUNTER — TELEPHONE (OUTPATIENT)
Dept: FAMILY MEDICINE CLINIC | Facility: CLINIC | Age: 70
End: 2025-01-17

## 2025-01-17 NOTE — TELEPHONE ENCOUNTER
----- Message from Essie MIRANDA sent at 1/17/2025 11:48 AM EST -----  Patient is due for a visit with Dr Goins    Please call to schedule    Thank you

## 2025-01-17 NOTE — TELEPHONE ENCOUNTER
----- Message from Zaid MARTINEZ sent at 1/17/2025 12:49 PM EST -----  Lvm for pt to schedule yearly AWV  ----- Message -----  From: Essie Ruiz MA  Sent: 1/17/2025  11:49 AM EST  To: [facility]    Patient is due for a visit with Dr Goins    Please call to schedule    Thank you